# Patient Record
Sex: MALE | Race: WHITE | NOT HISPANIC OR LATINO | Employment: STUDENT | RURAL
[De-identification: names, ages, dates, MRNs, and addresses within clinical notes are randomized per-mention and may not be internally consistent; named-entity substitution may affect disease eponyms.]

---
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
## 2025-03-26 ENCOUNTER — HOSPITAL ENCOUNTER (EMERGENCY)
Facility: HOSPITAL | Age: 20
Discharge: HOME | End: 2025-03-26
Payer: COMMERCIAL

## 2025-03-26 VITALS
BODY MASS INDEX: 20.99 KG/M2 | OXYGEN SATURATION: 98 % | RESPIRATION RATE: 16 BRPM | TEMPERATURE: 99 F | HEART RATE: 83 BPM | SYSTOLIC BLOOD PRESSURE: 120 MMHG | WEIGHT: 155 LBS | DIASTOLIC BLOOD PRESSURE: 80 MMHG | HEIGHT: 72 IN

## 2025-03-26 DIAGNOSIS — S81.812A LACERATION OF LEFT LEG, INITIAL ENCOUNTER: Primary | ICD-10-CM

## 2025-03-26 DIAGNOSIS — Z23 NEED FOR TETANUS, DIPHTHERIA, AND ACELLULAR PERTUSSIS (TDAP) VACCINE: ICD-10-CM

## 2025-03-26 PROCEDURE — 90715 TDAP VACCINE 7 YRS/> IM: CPT

## 2025-03-26 PROCEDURE — 63600175 PHARM REV CODE 636 W HCPCS

## 2025-03-26 PROCEDURE — 90471 IMMUNIZATION ADMIN: CPT

## 2025-03-26 PROCEDURE — 25000003 PHARM REV CODE 250

## 2025-03-26 RX ORDER — HYDROCODONE BITARTRATE AND ACETAMINOPHEN 5; 325 MG/1; MG/1
1 TABLET ORAL
Refills: 0 | Status: COMPLETED | OUTPATIENT
Start: 2025-03-26 | End: 2025-03-26

## 2025-03-26 RX ORDER — LIDOCAINE HYDROCHLORIDE AND EPINEPHRINE 10; 10 UG/ML; MG/ML
20 INJECTION, SOLUTION INFILTRATION; PERINEURAL ONCE
Status: COMPLETED | OUTPATIENT
Start: 2025-03-26 | End: 2025-03-26

## 2025-03-26 RX ORDER — HYDROCODONE BITARTRATE AND ACETAMINOPHEN 5; 325 MG/1; MG/1
1 TABLET ORAL EVERY 6 HOURS PRN
Qty: 12 TABLET | Refills: 0 | Status: SHIPPED | OUTPATIENT
Start: 2025-03-26

## 2025-03-26 RX ORDER — LIDOCAINE HYDROCHLORIDE AND EPINEPHRINE 10; 10 UG/ML; MG/ML
20 INJECTION, SOLUTION INFILTRATION; PERINEURAL ONCE
Status: DISCONTINUED | OUTPATIENT
Start: 2025-03-26 | End: 2025-03-26

## 2025-03-26 RX ADMIN — HYDROCODONE BITARTRATE AND ACETAMINOPHEN 1 TABLET: 5; 325 TABLET ORAL at 02:03

## 2025-03-26 RX ADMIN — BACITRACIN ZINC, NEOMYCIN, POLYMYXIN B 1 EACH: 400; 3.5; 5 OINTMENT TOPICAL at 02:03

## 2025-03-26 RX ADMIN — CLOSTRIDIUM TETANI TOXOID ANTIGEN (FORMALDEHYDE INACTIVATED), CORYNEBACTERIUM DIPHTHERIAE TOXOID ANTIGEN (FORMALDEHYDE INACTIVATED), BORDETELLA PERTUSSIS TOXOID ANTIGEN (GLUTARALDEHYDE INACTIVATED), BORDETELLA PERTUSSIS FILAMENTOUS HEMAGGLUTININ ANTIGEN (FORMALDEHYDE INACTIVATED), BORDETELLA PERTUSSIS PERTACTIN ANTIGEN, AND BORDETELLA PERTUSSIS FIMBRIAE 2/3 ANTIGEN 0.5 ML: 5; 2; 2.5; 5; 3; 5 INJECTION, SUSPENSION INTRAMUSCULAR at 02:03

## 2025-03-26 RX ADMIN — LIDOCAINE HYDROCHLORIDE,EPINEPHRINE BITARTRATE 20 ML: 10; .01 INJECTION, SOLUTION INFILTRATION; PERINEURAL at 01:03

## 2025-03-26 NOTE — ED PROVIDER NOTES
Encounter Date: 3/26/2025       History     Chief Complaint   Patient presents with    Laceration     Left Thigh     20-year-old male presents to the ED for laceration to the left lower extremity from an accidental self-inflicted chain saw injury.  He does have multiple lacerations over the anterior distal thigh proximal to the knee.  It was able to type pressure with clothing which has control of the bleeding.  Denies any weakness, numbness, loss of range of motion, or any other complaints at this time.  Ambulating without difficulty.  Unsure of his last tetanus.  Only pain is at the laceration sites.  Known history of heart valve replacement but denies any chronic complications.  Unsure of his last tetanus.  He appears in no immediate distress.    The history is provided by the patient.     Review of patient's allergies indicates:   Allergen Reactions    Omnicef [cefdinir] Nausea And Vomiting     Past Medical History:   Diagnosis Date    History of heart valve replacement 2004 and in 2015     Past Surgical History:   Procedure Laterality Date    CARDIAC SURGERY       No family history on file.  Social History[1]  Review of Systems   Constitutional:  Negative for activity change, appetite change and fever.   HENT:  Negative for congestion and sore throat.    Eyes: Negative.    Respiratory:  Negative for cough and shortness of breath.    Cardiovascular:  Negative for chest pain and palpitations.   Gastrointestinal:  Negative for abdominal pain, nausea and vomiting.   Endocrine: Negative.    Genitourinary:  Negative for dysuria and frequency.   Musculoskeletal:  Negative for arthralgias, back pain, neck pain and neck stiffness.   Skin:  Positive for wound (Left lower extremity). Negative for color change, pallor and rash.   Allergic/Immunologic: Negative.    Neurological:  Negative for dizziness, syncope, speech difficulty, weakness, numbness and headaches.   Hematological:  Does not bruise/bleed easily.    Psychiatric/Behavioral:  Negative for confusion. The patient is not nervous/anxious.    All other systems reviewed and are negative.      Physical Exam     Initial Vitals [03/26/25 1312]   BP Pulse Resp Temp SpO2   133/76 107 20 98.7 °F (37.1 °C) 99 %      MAP       --         Physical Exam    Nursing note and vitals reviewed.  Constitutional: He appears well-developed and well-nourished. He is not diaphoretic. No distress.   HENT:   Head: Normocephalic and atraumatic. Mouth/Throat: Oropharynx is clear and moist.   Eyes: Conjunctivae and EOM are normal. Pupils are equal, round, and reactive to light.   Neck: Neck supple.   Normal range of motion.  Cardiovascular:  Normal rate, regular rhythm and normal heart sounds.           Pulses:       Dorsalis pedis pulses are 2+ on the left side.        Posterior tibial pulses are 2+ on the left side.   Pulmonary/Chest: Breath sounds normal. No respiratory distress. He has no wheezes. He has no rhonchi. He has no rales. He exhibits no tenderness.   Abdominal: Abdomen is soft. Bowel sounds are normal. He exhibits no distension. There is no abdominal tenderness. There is no rebound and no guarding.   Musculoskeletal:         General: Normal range of motion.      Cervical back: Normal range of motion and neck supple.        Legs:       Comments: No tenderness or visible trauma over the knee joint. Full ROM maintained. Pulse, motor, and sensory remain intact in the affected extremity.     Neurological: He is alert and oriented to person, place, and time. He has normal strength. GCS score is 15. GCS eye subscore is 4. GCS verbal subscore is 5. GCS motor subscore is 6.   Skin: Skin is warm and dry. Capillary refill takes less than 2 seconds.   Psychiatric: He has a normal mood and affect. His behavior is normal. Judgment and thought content normal.         Medical Screening Exam   See Full Note    ED Course   Lac Repair    Date/Time: 3/26/2025 2:11 PM    Performed by: Tiffany  TAYA Shipley  Authorized by: Albert Allen FNP    Consent:     Consent obtained:  Verbal    Consent given by:  Patient    Risks, benefits, and alternatives were discussed: yes      Risks discussed:  Infection, poor cosmetic result, poor wound healing, need for additional repair and retained foreign body    Alternatives discussed:  No treatment  Universal protocol:     Procedure explained and questions answered to patient or proxy's satisfaction: yes      Relevant documents present and verified: yes      Patient identity confirmed:  Verbally with patient and arm band  Anesthesia:     Anesthesia method:  Local infiltration    Local anesthetic:  Lidocaine 1% WITH epi  Laceration details:     Location:  Leg    Leg location:  L upper leg    Length (cm):  6    Depth (mm):  4  Pre-procedure details:     Preparation:  Patient was prepped and draped in usual sterile fashion  Exploration:     Limited defect created (wound extended): no      Hemostasis achieved with:  Direct pressure    Imaging outcome: foreign body not noted      Wound exploration: wound explored through full range of motion and entire depth of wound visualized      Wound extent: no muscle damage noted, no tendon damage noted and no underlying fracture noted    Treatment:     Area cleansed with:  Povidone-iodine and saline    Amount of cleaning:  Extensive    Irrigation solution:  Sterile saline    Irrigation volume:  500 ml    Irrigation method:  Pressure wash    Debridement:  None    Undermining:  None    Scar revision: no    Skin repair:     Repair method:  Sutures    Suture size:  4-0    Suture material:  Prolene    Suture technique:  Simple interrupted    Number of sutures:  16  Approximation:     Approximation:  Close  Repair type:     Repair type:  Intermediate  Post-procedure details:     Dressing:  Antibiotic ointment and sterile dressing    Procedure completion:  Tolerated well, no immediate complications  Lac Repair    Date/Time: 3/26/2025  2:20 PM    Performed by: Albert Allen FNP  Authorized by: Albert Allen FNP    Consent:     Consent obtained:  Verbal    Consent given by:  Patient    Risks, benefits, and alternatives were discussed: yes      Risks discussed:  Infection, need for additional repair, poor cosmetic result, poor wound healing and retained foreign body    Alternatives discussed:  No treatment  Universal protocol:     Procedure explained and questions answered to patient or proxy's satisfaction: yes      Relevant documents present and verified: yes      Patient identity confirmed:  Verbally with patient and arm band  Anesthesia:     Anesthesia method:  Local infiltration    Local anesthetic:  Lidocaine 1% WITH epi  Laceration details:     Location:  Leg    Leg location:  L upper leg    Length (cm):  5    Depth (mm):  4  Pre-procedure details:     Preparation:  Patient was prepped and draped in usual sterile fashion  Exploration:     Limited defect created (wound extended): no      Hemostasis achieved with:  Direct pressure    Imaging outcome: foreign body not noted      Wound exploration: wound explored through full range of motion and entire depth of wound visualized      Wound extent: no muscle damage noted, no tendon damage noted and no underlying fracture noted      Contaminated: no    Treatment:     Area cleansed with:  Povidone-iodine and saline    Amount of cleaning:  Extensive    Irrigation solution:  Sterile saline    Irrigation volume:  500 ml    Irrigation method:  Pressure wash    Debridement:  None    Undermining:  None    Scar revision: no    Skin repair:     Repair method:  Sutures    Suture size:  4-0    Suture material:  Prolene    Suture technique:  Simple interrupted    Number of sutures:  15  Approximation:     Approximation:  Close  Repair type:     Repair type:  Intermediate  Post-procedure details:     Dressing:  Antibiotic ointment and sterile dressing    Procedure completion:  Tolerated well, no  immediate complications    Labs Reviewed - No data to display       Imaging Results    None          Medications   Tdap vaccine injection 0.5 mL (0.5 mLs Intramuscular Given 3/26/25 1437)   LIDOcaine-EPINEPHrine 1%-1:100,000 injection 20 mL (20 mLs Other Given 3/26/25 1326)   neomycin-bacitracnZn-polymyxnB packet (1 each Topical (Top) Given 3/26/25 1442)   HYDROcodone-acetaminophen 5-325 mg per tablet 1 tablet (1 tablet Oral Given 3/26/25 1440)     Medical Decision Making  Presents to the ED with multiple lacerations to the left distal thigh.  Bleeding controlled at this time.  Alert and oriented x4.  GCS 15.  Vital signs stable.  Unsure of his last tetanus.  Injuries or above the knee joint.  Full ROM maintained.  Pulse, motor, sensory remain intact distally in the affected extremity.  Soft tissue injury and therefore x-ray is not felt necessary at this time.  Wound closure options discussed in detail and they are agreeable closure with sutures.  Wound was irrigated with 1 L saline/Betadine mixture.  No visualized foreign body.  See procedure note for further details.  The importance of wound care and monitoring to attempt to prevent infection was discussed in detail.  No lifting or strenuous activity to prevent reopening of the wound.  Two day wound check.  Fourteen day suture removal.  Signs and symptoms of infection discussed in detail and patient instructed to return immediately if any of these develop.  Hydrocodone 5 mg p.o. for pain here and we will follow up with a short course for pain management at home.  Hibiclens provided for home use.  All questions answered.  He verbalizes understanding and is in agreement with this plan.    Amount and/or Complexity of Data Reviewed  Independent Historian:      Details: 20-year-old male presents to the ED for laceration to the left lower extremity from an accidental self-inflicted chain saw injury.  He does have multiple lacerations over the anterior distal thigh  proximal to the knee.  It was able to type pressure with clothing which has control of the bleeding.  Denies any weakness, numbness, loss of range of motion, or any other complaints at this time.  Ambulating without difficulty.  Unsure of his last tetanus.  Only pain is at the laceration sites.  Known history of heart valve replacement but denies any chronic complications.  Unsure of his last tetanus.  He appears in no immediate distress.    Risk  OTC drugs.  Prescription drug management.  Risk Details: Micha Esqueda has no evidence of retained foreign body; nerve, tendon, or vascular injury; fracture; compartment syndrome; or infection. Lac repaired. See procedure note for full documentation. Tetanus updated.   Data Reviewed/Counseling: I have reviwed the patient's vital signs, nursing notes, and other relevant tests/information. I had a detailed discussion regarding the historical points, exam findings, and any diagnostic results supporting the discharge diagnosis. I also discussed the need for outpatient follow-up and the need to return to the ED if symptoms worsen or if there are any questions or concerns that arise at home. Wound check/care, suture removal instructions reviewed with patient. No questions or concerns.    Final diagnoses:  [S81.812A] Laceration of left leg, initial encounter (Primary)  [Z23] Need for tetanus, diphtheria, and acellular pertussis (Tdap) vaccine                                      Clinical Impression:   Final diagnoses:  [S81.812A] Laceration of left leg, initial encounter (Primary)  [Z23] Need for tetanus, diphtheria, and acellular pertussis (Tdap) vaccine        ED Disposition Condition    Discharge Stable          ED Prescriptions       Medication Sig Dispense Start Date End Date Auth. Provider    HYDROcodone-acetaminophen (NORCO) 5-325 mg per tablet Take 1 tablet by mouth every 6 (six) hours as needed for Pain. 12 tablet 3/26/2025 -- Albert Allen FNP          Follow-up  Information    None            [1]   Social History  Tobacco Use    Smoking status: Every Day     Types: Vaping with nicotine    Smokeless tobacco: Current   Substance Use Topics    Alcohol use: Never    Drug use: Never        Albert Allen, [facility]  03/26/25 1444

## 2025-03-26 NOTE — Clinical Note
"Micha Tang" Dheeraj was seen and treated in our emergency department on 3/26/2025.  He may return to work after being cleared by follow-up physician 03/31/2025.  No lifting or strenuous activity until wound is healed.     If you have any questions or concerns, please don't hesitate to call.      Albert Allen, [facility]"

## 2025-03-26 NOTE — DISCHARGE INSTRUCTIONS
Wash 3 times daily with provided cleaning solution, pat dry, apply Neosporin ointment 3 times daily, and cover with a bandage.  Avoid strenuous activity or lifting until wound is healed to prevent reopening of the wound.  Take pain medication as prescribed.  Return to the emergency department in 2 days for a wound check.  Return to the emergency department in 14 days for suture removal.  Return to the emergency department sooner for fever, chills, severe redness, drainage, uncontrolled pain, or any other new or worrisome symptoms.

## 2025-03-28 ENCOUNTER — HOSPITAL ENCOUNTER (EMERGENCY)
Facility: HOSPITAL | Age: 20
Discharge: HOME | End: 2025-03-28
Payer: COMMERCIAL

## 2025-03-28 VITALS
HEIGHT: 72 IN | HEART RATE: 87 BPM | TEMPERATURE: 98 F | DIASTOLIC BLOOD PRESSURE: 72 MMHG | WEIGHT: 155 LBS | RESPIRATION RATE: 16 BRPM | SYSTOLIC BLOOD PRESSURE: 117 MMHG | BODY MASS INDEX: 20.99 KG/M2 | OXYGEN SATURATION: 97 %

## 2025-03-28 DIAGNOSIS — Z51.89 VISIT FOR WOUND CHECK: Primary | ICD-10-CM

## 2025-03-28 PROCEDURE — 99499 UNLISTED E&M SERVICE: CPT | Mod: GF,,, | Performed by: NURSE PRACTITIONER

## 2025-03-28 PROCEDURE — 99999 HC NO LEVEL OF SERVICE - ED ONLY: CPT

## 2025-03-28 NOTE — ED TRIAGE NOTES
Pt presents for a wound check to left thigh with sutures that was placed 3 days ago. No redness, drainage or edema noted.

## 2025-03-28 NOTE — ED PROVIDER NOTES
Encounter Date: 3/28/2025       History     Chief Complaint   Patient presents with    Wound Check     Pt here for wound recheck after sutures placed to left leg 2 days ago.  Pt denies problems since placement.     The history is provided by the patient.   Wound Check   He was treated in the ED 2 to 3 days ago. Previous treatment in the ED includes Laceration repair. Treatments since wound repair include soaks and antibiotic ointment use. There has been no drainage from the wound. There is no redness present. There is no swelling present. The pain has improved. He has no difficulty moving the affected extremity or digit.     Review of patient's allergies indicates:   Allergen Reactions    Omnicef [cefdinir] Nausea And Vomiting     Past Medical History:   Diagnosis Date    History of heart valve replacement 2004 and in 2015     Past Surgical History:   Procedure Laterality Date    CARDIAC SURGERY       No family history on file.  Social History[1]  Review of Systems   Constitutional:  Negative for fever.   HENT:  Negative for sore throat.    Respiratory:  Negative for shortness of breath.    Cardiovascular:  Negative for chest pain.   Gastrointestinal:  Negative for nausea.   Genitourinary:  Negative for dysuria.   Musculoskeletal:  Negative for back pain.   Skin:  Positive for wound. Negative for rash.   Neurological:  Negative for weakness.   Hematological:  Does not bruise/bleed easily.       Physical Exam     Initial Vitals [03/28/25 1225]   BP Pulse Resp Temp SpO2   117/72 87 16 98 °F (36.7 °C) 97 %      MAP       --         Physical Exam    Nursing note and vitals reviewed.  Constitutional: He appears well-developed and well-nourished. He is not diaphoretic. No distress.   HENT:   Head: Normocephalic and atraumatic.   Eyes: Pupils are equal, round, and reactive to light.   Neck: Neck supple.   Cardiovascular:  Normal rate, regular rhythm, normal heart sounds and intact distal pulses.     Exam reveals no gallop  and no friction rub.       No murmur heard.  Pulmonary/Chest: Breath sounds normal. No respiratory distress. He has no wheezes. He has no rhonchi. He has no rales. He exhibits no tenderness.   Musculoskeletal:      Cervical back: Neck supple.        Legs:      Neurological: He is alert and oriented to person, place, and time. GCS score is 15. GCS eye subscore is 4. GCS verbal subscore is 5. GCS motor subscore is 6.   Skin: Skin is warm and dry. Capillary refill takes less than 2 seconds.   Psychiatric: He has a normal mood and affect.         Medical Screening Exam   See Full Note    ED Course   Procedures  Labs Reviewed - No data to display       Imaging Results    None          Medications - No data to display  Medical Decision Making  Problems Addressed:  Visit for wound check: self-limited or minor problem                                      Clinical Impression:   Final diagnoses:  [Z51.89] Visit for wound check (Primary)        ED Disposition Condition    Discharge Stable          ED Prescriptions    None       Follow-up Information       Follow up With Specialties Details Why Contact Info    [facility]  Schedule an appointment as soon as possible for a visit  As needed [address] [place] [id number] [phone]                 [1]   Social History  Tobacco Use    Smoking status: Every Day     Types: Vaping with nicotine    Smokeless tobacco: Current   Substance Use Topics    Alcohol use: Never    Drug use: Never        Adelina Ward FNP  03/28/25 1233

## 2025-04-09 ENCOUNTER — HOSPITAL ENCOUNTER (EMERGENCY)
Facility: HOSPITAL | Age: 20
Discharge: HOME | End: 2025-04-09
Payer: COMMERCIAL

## 2025-04-09 VITALS
HEIGHT: 72 IN | WEIGHT: 155 LBS | BODY MASS INDEX: 20.99 KG/M2 | RESPIRATION RATE: 18 BRPM | DIASTOLIC BLOOD PRESSURE: 58 MMHG | TEMPERATURE: 98 F | OXYGEN SATURATION: 98 % | HEART RATE: 77 BPM | SYSTOLIC BLOOD PRESSURE: 112 MMHG

## 2025-04-09 DIAGNOSIS — Z48.02 ENCOUNTER FOR REMOVAL OF SUTURES: Primary | ICD-10-CM

## 2025-04-09 PROCEDURE — 99499 UNLISTED E&M SERVICE: CPT | Mod: GF,,,

## 2025-04-09 PROCEDURE — 99999 HC NO LEVEL OF SERVICE - ED ONLY: CPT

## 2025-04-09 NOTE — DISCHARGE INSTRUCTIONS
Keep wound clean and dry.  Apply Neosporin ointment 3 times daily until wound is completely healed.  Avoid strenuous activity until wound is completely healed.  Return to the emergency department for any new or worrisome symptoms.

## 2025-04-09 NOTE — ED PROVIDER NOTES
Encounter Date: 4/9/2025       History     Chief Complaint   Patient presents with    Suture / Staple Removal     Been in 14 days     20-year-old male presents to the ED for suture removals of left lower extremity laceration.  Seen in the ED 2 weeks prior for accidental self-inflicted chain saw injury to the left leg.  Thirty-one sutures placed at that time.  He denies any complaints today.  Reports that there has been no issues during the healing process.  Tetanus up-to-date.  He appears in no immediate distress.    The history is provided by the patient.     Review of patient's allergies indicates:   Allergen Reactions    Omnicef [cefdinir] Nausea And Vomiting     Past Medical History:   Diagnosis Date    History of heart valve replacement 2004 and in 2015     Past Surgical History:   Procedure Laterality Date    CARDIAC SURGERY       No family history on file.  Social History[1]  Review of Systems   Constitutional:  Negative for chills and fever.   HENT:  Negative for congestion and sore throat.    Eyes: Negative.    Respiratory:  Negative for cough and shortness of breath.    Cardiovascular:  Negative for chest pain and palpitations.   Gastrointestinal:  Negative for abdominal pain, nausea and vomiting.   Endocrine: Negative.    Genitourinary:  Negative for dysuria and frequency.   Musculoskeletal:  Negative for arthralgias, back pain, neck pain and neck stiffness.   Skin:  Negative for color change, pallor and rash.   Allergic/Immunologic: Negative.    Neurological:  Negative for dizziness, syncope, speech difficulty, weakness and headaches.   Hematological:  Does not bruise/bleed easily.   Psychiatric/Behavioral:  Negative for confusion. The patient is not nervous/anxious.    All other systems reviewed and are negative.      Physical Exam     Initial Vitals   BP Pulse Resp Temp SpO2   -- -- -- -- --      MAP       --         Physical Exam    Nursing note and vitals reviewed.  Constitutional: He appears  well-developed and well-nourished. He is not diaphoretic. No distress.   HENT:   Head: Normocephalic and atraumatic. Mouth/Throat: Oropharynx is clear and moist.   Eyes: Conjunctivae and EOM are normal. Pupils are equal, round, and reactive to light.   Neck: Neck supple.   Normal range of motion.  Cardiovascular:  Normal rate, regular rhythm and normal heart sounds.           Pulmonary/Chest: Breath sounds normal. No respiratory distress. He has no wheezes. He has no rhonchi. He has no rales. He exhibits no tenderness.   Abdominal: Abdomen is soft. Bowel sounds are normal. He exhibits no distension. There is no abdominal tenderness. There is no rebound and no guarding.   Musculoskeletal:         General: Normal range of motion.      Cervical back: Normal range of motion and neck supple.        Legs:      Neurological: He is alert and oriented to person, place, and time. He has normal strength. GCS score is 15. GCS eye subscore is 4. GCS verbal subscore is 5. GCS motor subscore is 6.   Skin: Skin is warm and dry. Capillary refill takes less than 2 seconds.   Psychiatric: He has a normal mood and affect. His behavior is normal. Judgment and thought content normal.         Medical Screening Exam   See Full Note    ED Course   Procedures  Labs Reviewed - No data to display       Imaging Results    None          Medications - No data to display  Medical Decision Making  Here for suture removal.  Wound is approximated well.  No signs or symptoms of infection on exam.  He denies any and all complaints.  We will perform suture removal of the 31 sutures.  Continue routine wound care.  Neosporin 3 times daily.  Avoid strenuous activity until wound is completely healed.  Strict ED return precautions explained in detail.  All questions answered.  He verbalizes understanding and is in agreement with this plan.    Amount and/or Complexity of Data Reviewed  Independent Historian:      Details: 20-year-old male presents to the ED  for suture removals of left lower extremity laceration.  Seen in the ED 2 weeks prior for accidental self-inflicted chain saw injury to the left leg.  Thirty-one sutures placed at that time.  He denies any complaints today.  Reports that there has been no issues during the healing process.  Tetanus up-to-date.  He appears in no immediate distress.    Risk  Risk Details: At this time, I do not feel that radiology studies or lab will add any benefit to care or diagnostics since there is no reported injury, signs of trauma or signs/sx of infection. Wound healing well. I feel that patient has reached maximum benefit from ED evaluation, treatment and observation. I thoroughly explained all findings to *patient/parent to the best of my ability and answered all questions to their satisfaction. I educated *patient/parent on the general/expected course of the condition and treatment options in ED and after discharge. I also informed *patient/parent that our evaluation in the emergency department today is an evaluation of the condition in one moment in time. If there is any worsening of the condition or signs of infection, the *patient/parent has been instructed to return to the ED immediately for further evaluation.  Advised to follow up with the PCP or return to the ED for any new or worrisome symptoms.  Patient/parent verbalized understanding of the instructions and is agreeable to disposition. No questions or concerns at this time.     Final diagnoses:  [Z48.02] Encounter for removal of sutures (Primary)                                      Clinical Impression:   Final diagnoses:  [Z48.02] Encounter for removal of sutures (Primary)        ED Disposition Condition    Discharge Stable          ED Prescriptions    None       Follow-up Information    None            [1]   Social History  Tobacco Use    Smoking status: Every Day     Types: Vaping with nicotine    Smokeless tobacco: Current   Substance Use Topics    Alcohol  use: Never    Drug use: Never        Albert Allen, [facility]  04/09/25 1531

## 2025-04-09 NOTE — Clinical Note
"Micha Tang" Dheeraj was seen and treated in our emergency department on 4/9/2025.  He may return to work on 04/10/2025.       If you have any questions or concerns, please don't hesitate to call.      Albert Allen, FNP"